# Patient Record
(demographics unavailable — no encounter records)

---

## 2025-03-17 NOTE — PHYSICAL EXAM
[No Acute Distress] : in no acute distress [Well developed] : well developed [Well Nourished] : ~L well nourished [Oriented x3] : oriented to person, place, and time [Mass (___ Cm)] : no ~M [unfilled] abdominal mass was palpated [Tenderness] : ~T no ~M abdominal tenderness observed [Distended] : not distended

## 2025-03-17 NOTE — DISCUSSION/SUMMARY
[FreeTextEntry1] : #OAB -Continue Myrbetriq 50 mg daily, Rx provided with refills.   -BP today is 123/72 -She was advised to discuss the safety of vaginal estrogen cream with her Oncologist prior to starting vaginal estrogen cream  -She will follow-up in 3 months or sooner as needed   All questions were answered to her satisfaction, and she will contact the office prn

## 2025-03-17 NOTE — HISTORY OF PRESENT ILLNESS
[FreeTextEntry1] : Batsheva presents for follow up with a h/o OAB, UUI and GUSM. She was recommended to start behavioral and fluid modifications, Myrbetriq as well as low dose vaginal estrogen cream. She reports a 90% improvement in her symptoms.  She has not started the vaginal estrogen and reports that she feels like she is overall satisfied with the current regimen.  She does have a history of breast cancer and is on Letrozole.  She is somewhat hesitant to use vaginal estrogen cream.